# Patient Record
Sex: FEMALE | Race: WHITE | NOT HISPANIC OR LATINO | URBAN - METROPOLITAN AREA
[De-identification: names, ages, dates, MRNs, and addresses within clinical notes are randomized per-mention and may not be internally consistent; named-entity substitution may affect disease eponyms.]

---

## 2017-09-19 ENCOUNTER — APPOINTMENT (RX ONLY)
Dept: URBAN - METROPOLITAN AREA CLINIC 43 | Facility: CLINIC | Age: 82
Setting detail: DERMATOLOGY
End: 2017-09-19

## 2017-09-19 PROBLEM — D48.5 NEOPLASM OF UNCERTAIN BEHAVIOR OF SKIN: Status: ACTIVE | Noted: 2017-09-19

## 2017-09-19 PROBLEM — L20.84 INTRINSIC (ALLERGIC) ECZEMA: Status: ACTIVE | Noted: 2017-09-19

## 2017-09-19 PROCEDURE — ? TREATMENT REGIMEN

## 2017-09-19 PROCEDURE — 99212 OFFICE O/P EST SF 10 MIN: CPT | Mod: 25

## 2017-09-19 PROCEDURE — ? BIOPSY BY SHAVE METHOD AND DESTRUCTION

## 2017-09-19 PROCEDURE — 17263 DSTRJ MAL LES T/A/L 2.1-3.0: CPT

## 2017-09-19 NOTE — PROCEDURE: BIOPSY BY SHAVE METHOD AND DESTRUCTION
Number Of Curettages: 3
Consent: Written consent was obtained and risks were reviewed including but not limited to scarring, infection, bleeding, scabbing, incomplete removal, nerve damage and allergy to anesthesia.
Wound Care: Petrolatum
Anesthesia Type: 1% lidocaine with epinephrine
Billing Type: Third-Party Bill
Size Of Lesion After Curettage: 2.6
Accession #: [id number]
Bill As?: Malignant Destruction
Destruction Type: electrodesiccation
Lab: [id number]
Bill For Surgical Tray: no
Size Of Lesion In Cm (Optional): 2.3
Notification Instructions: Patient will be notified of biopsy results. However, patient instructed to call the office if not contacted within 2 weeks.
Detail Level: Detailed
Anesthesia Volume In Cc: 1
Post-Care Instructions: I reviewed with the patient in detail post-care instructions. Patient is to keep the biopsy site dry overnight, and then apply plain petrolatum (Vaseline ointment) twice daily until healed. Patient may apply hydrogen peroxide soaks to remove any crusting.
Biopsy Type: H and E

## 2017-09-19 NOTE — PROCEDURE: TREATMENT REGIMEN
Detail Level: Zone
Notification: Please note that there are new Treatment Regimen plans which have an enhanced patient education handout experience.  The plans are called Treatment Regimen (Acne), Treatment Regimen (Atopic Dermatitis), Treatment Regimen (Rosacea), Treatment Regimen (Sun Protection), Treatment Regimen (Cosmetic Products), and Treatment Regimen (Xerosis).
Plan: About 5 small lesions concerning for possible small squamous cell carcinoma \\nHas had SCC on the legs in the past\\nNone are painful or otherwise bothersome\\nWe discussed performing shave biopsy with C&D treatment today and she prefers to defer this given lack of symptoms and the other larger lesion on the left leg that needs treatment \\nWill treat in future as needed\\nShe felt comfortable with this plan

## 2018-03-07 ENCOUNTER — RX ONLY (OUTPATIENT)
Age: 83
Setting detail: RX ONLY
End: 2018-03-07

## 2018-03-07 ENCOUNTER — APPOINTMENT (RX ONLY)
Dept: URBAN - METROPOLITAN AREA CLINIC 43 | Facility: CLINIC | Age: 83
Setting detail: DERMATOLOGY
End: 2018-03-07

## 2018-03-07 DIAGNOSIS — R60.0 LOCALIZED EDEMA: ICD-10-CM

## 2018-03-07 DIAGNOSIS — L97 NON-PRESSURE CHRONIC ULCER OF LOWER LIMB, NOT ELSEWHERE CLASSIFIED: ICD-10-CM

## 2018-03-07 PROBLEM — L97.819 NON-PRESSURE CHRONIC ULCER OF OTHER PART OF RIGHT LOWER LEG WITH UNSPECIFIED SEVERITY: Status: ACTIVE | Noted: 2018-03-07

## 2018-03-07 PROCEDURE — ? TREATMENT REGIMEN

## 2018-03-07 PROCEDURE — 99213 OFFICE O/P EST LOW 20 MIN: CPT

## 2018-03-07 RX ORDER — MUPIROCIN 20 MG/G
OINTMENT TOPICAL
Qty: 1 | Refills: 1 | Status: ERX | COMMUNITY
Start: 2018-03-07

## 2018-03-07 ASSESSMENT — LOCATION DETAILED DESCRIPTION DERM
LOCATION DETAILED: RIGHT PROXIMAL PRETIBIAL REGION
LOCATION DETAILED: LEFT DISTAL PRETIBIAL REGION
LOCATION DETAILED: RIGHT DISTAL PRETIBIAL REGION

## 2018-03-07 ASSESSMENT — LOCATION SIMPLE DESCRIPTION DERM
LOCATION SIMPLE: LEFT PRETIBIAL REGION
LOCATION SIMPLE: RIGHT PRETIBIAL REGION

## 2018-03-07 ASSESSMENT — LOCATION ZONE DERM: LOCATION ZONE: LEG

## 2018-03-07 NOTE — PROCEDURE: TREATMENT REGIMEN
Plan: Lower extremity ulcer, large\\n- Daily wound care:\\n* gently wash with warm soapy water once daily and gently rub to remove goo or crust\\n* The soap you use doesn’t matter: can try Cetaphil gentle skin cleanser or plain bar soap or liquid hand soap if desired \\n* pat dry with soft cloth and apply a thin layer of mupirocin antibiotic ointment over the whole area\\n* put a non-stick pad over the wound\\n* you can secure the pad with a Coban wrap or paper adhesive tape\\n* wear your compression stockings all day and remove the stockings at night\\n- Recheck in 1 week \\n- The wound may ultimately be caused by a skin cancer in the area and I will be following it closely and possibly doing another biopsy to confirm
Detail Level: Zone
Plan: Increased leg swelling despite 24 hour per day compression\\nI recommend purchasing new knee-high compression stockings, 20-30mm Hg, as the elastic does wear out after 6-12 mo with daily use \\nI recommend checking in with your primary care physician to be sure you are receiving adequate nutrition \\nYou will need someone to help with your leg once daily

## 2018-03-14 ENCOUNTER — APPOINTMENT (RX ONLY)
Dept: URBAN - METROPOLITAN AREA CLINIC 43 | Facility: CLINIC | Age: 83
Setting detail: DERMATOLOGY
End: 2018-03-14

## 2018-03-14 PROBLEM — D48.5 NEOPLASM OF UNCERTAIN BEHAVIOR OF SKIN: Status: ACTIVE | Noted: 2018-03-14

## 2018-03-14 PROCEDURE — 11100: CPT

## 2018-03-14 PROCEDURE — 11101: CPT

## 2018-03-14 PROCEDURE — ? BIOPSY BY SHAVE METHOD

## 2018-03-14 NOTE — PROCEDURE: BIOPSY BY SHAVE METHOD
Anesthesia Volume In Cc: 0
Detail Level: Detailed
Post-Care Instructions: I reviewed with the patient in detail post-care instructions. Patient is to keep the biopsy site dry and the bandage in place overnight.  Then wash once daily with warm soapy water, pat dry gently and apply mupirocin ointment once daily over the wound.  Cover with a nonstick dressing and mupirocin.
Billing Type: Third-Party Bill
Biopsy Type: H and E
Dressing: dry sterile dressing
Notification Instructions: Patient will be notified of biopsy results by telephone within 7-10 days in most cases. However, patient instructed to call the office if not contacted within 2 weeks.
Electrodesiccation And Curettage Text: The wound bed was treated with electrodesiccation and curettage after the biopsy was performed.
Destruction After The Procedure: No
Consent: Written consent was obtained and risks were reviewed including but not limited to scarring, infection, bleeding, scabbing, incomplete removal, nerve damage and allergy to anesthesia.
Curettage Text: Curettage and cautery x 1 passes performed at time of biopsy
Lab: [id number]
Wound Care: Petrolatum
Anesthesia Type: 2% Xylocaine with sodium bicarbonate
Electrodesiccation Text: The wound bed was treated with electrodesiccation after the biopsy was performed.
Accession #: [id number]
Biopsy Method: Acu-Razor
Hemostasis: Pressure
Render Post-Care Instructions In Note?: yes
Silver Nitrate Text: The wound bed was treated with silver nitrate after the biopsy was performed.
Type Of Destruction Used: Curettage
Lab Facility: [id number]
Cryotherapy Text: The wound bed was treated with cryotherapy after the biopsy was performed.
Accession #: [id number]
Accession #: [id number]

## 2018-04-09 ENCOUNTER — APPOINTMENT (RX ONLY)
Dept: URBAN - METROPOLITAN AREA CLINIC 43 | Facility: CLINIC | Age: 83
Setting detail: DERMATOLOGY
End: 2018-04-09

## 2018-04-09 PROBLEM — C44.722 SQUAMOUS CELL CARCINOMA OF SKIN OF RIGHT LOWER LIMB, INCLUDING HIP: Status: ACTIVE | Noted: 2018-04-09

## 2018-04-09 PROCEDURE — ? TREATMENT REGIMEN

## 2018-04-09 PROCEDURE — ? INTRALESIONAL CHEMOTHERAPY INJECTION

## 2018-04-09 PROCEDURE — 96405 CHEMO INTRALESIONAL UP TO 7: CPT

## 2018-04-09 NOTE — PROCEDURE: TREATMENT REGIMEN
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 \\nCurrently asymptomatic and healed over \\nDiscussed treatment options including Mohs surgical excision with skin graft which she does not want\\nDiscussed radiation therapy and she likewise declined \\nDiscussed injections of methotrexate into the tumor which could shrink the tumor to the point that it is less likely to continue to enlarge and less likely to be painful or form an open sore (ulcer) again \\nWe also discussed observation only where she can contact me and her friend can check on it weekly and she can return for treatment if it should ever become bothersome for her although this may be difficult for me to treat at that point \\nDiscussed that I anticipate that this skin cancer will continue to enlarge and likely open back up into a painful open sore again \\nUltimately, she opted for a trial of the intralesional methotrexate shot\\nShe will return in 2 weeks for a recheck of the area
Detail Level: Zone

## 2018-04-09 NOTE — PROCEDURE: INTRALESIONAL CHEMOTHERAPY INJECTION
Detail Level: Detailed
Bill J-Code?: Yes
X Size Of Lesion In Cm (Optional): 2.1
Medication Injected: Methotrexate
Size Of Lesion In Cm (Optional): 2.2
Treatment Number (Optional): 1
Concentration (Mg/Ml Or Units/Ml): 25.0
Lot # (Optional): [id number]
Render Post-Care Instructions In Note?: no
Post-Care Instructions: I reviewed with the patient in detail post-care instructions.  Apply plain vaseline ointment to any raw or open areas.
Consent: The risks of medication reaction, injection site pain and lesion necrosis were reviewed with the patient.
Bill As A Line Item Or As Units: Line Item

## 2018-05-01 ENCOUNTER — APPOINTMENT (RX ONLY)
Dept: URBAN - METROPOLITAN AREA CLINIC 43 | Facility: CLINIC | Age: 83
Setting detail: DERMATOLOGY
End: 2018-05-01

## 2018-05-01 DIAGNOSIS — T1490XA CONTUSION OF UNSPECIFIED SITE: ICD-10-CM

## 2018-05-01 PROBLEM — S80.12XA CONTUSION OF LEFT LOWER LEG, INITIAL ENCOUNTER: Status: ACTIVE | Noted: 2018-05-01

## 2018-05-01 PROBLEM — C44.722 SQUAMOUS CELL CARCINOMA OF SKIN OF RIGHT LOWER LIMB, INCLUDING HIP: Status: ACTIVE | Noted: 2018-05-01

## 2018-05-01 PROCEDURE — ? COUNSELING

## 2018-05-01 PROCEDURE — 96405 CHEMO INTRALESIONAL UP TO 7: CPT

## 2018-05-01 PROCEDURE — ? INTRALESIONAL CHEMOTHERAPY INJECTION

## 2018-05-01 PROCEDURE — 10140 I&D HMTMA SEROMA/FLUID COLLJ: CPT

## 2018-05-01 PROCEDURE — ? TREATMENT REGIMEN

## 2018-05-01 PROCEDURE — ? INCISION AND DRAINAGE

## 2018-05-01 ASSESSMENT — LOCATION ZONE DERM: LOCATION ZONE: LEG

## 2018-05-01 ASSESSMENT — LOCATION SIMPLE DESCRIPTION DERM: LOCATION SIMPLE: LEFT PRETIBIAL REGION

## 2018-05-01 ASSESSMENT — LOCATION DETAILED DESCRIPTION DERM: LOCATION DETAILED: LEFT PROXIMAL PRETIBIAL REGION

## 2018-05-01 NOTE — PROCEDURE: TREATMENT REGIMEN
Detail Level: Zone
Plan: SCC, recurrent after C&D\\n- patient not candidate for Mohs given her preference, severe cognitive impairment and social situation \\n- less pain and now healed over after first IL MTX administration\\n- will repeat IL MTX 25mg to tumor again today\\n- recheck in 2 weeks
Plan: DRained coagulated blood today\\nDressed with telfa and compression stocking\\nNo apparent continued bleeding after evacuation\\nUnknown trauma provoked it\\nRecheck in 2 weeks

## 2018-05-01 NOTE — PROCEDURE: INTRALESIONAL CHEMOTHERAPY INJECTION
Consent: The risks of medication reaction, injection site pain and lesion necrosis were reviewed with the patient.
Medication Injected: Methotrexate
Concentration (Mg/Ml Or Units/Ml): 25.0
Total Volume (Ccs): 1
Bill J-Code?: Yes
Detail Level: Detailed
Post-Care Instructions: I reviewed with the patient in detail post-care instructions.  Apply plain vaseline ointment to any raw or open areas.
Treatment Number (Optional): 2
Size Of Lesion In Cm (Optional): 2.2
Administered By (Optional): Dr. Robles
X Size Of Lesion In Cm (Optional): 2.1
Lot # (Optional): [id number]
Render Post-Care Instructions In Note?: no
Bill As A Line Item Or As Units: Line Item

## 2018-05-01 NOTE — PROCEDURE: INCISION AND DRAINAGE
Suture Text: The incision was partially closed with
Detail Level: Detailed
Epidermal Closure: simple interrupted
Curette Text (Optional): After the contents were expressed a curette was used to partially remove the cyst wall
Dressing: pressure dressing with telfa
Drainage Type?: bloody
Render Postcare In Note?: No
Preparation Text: The area was prepped in the usual clean fashion.
Post-Care Instructions: I reviewed with the patient in detail post-care instructions. Patient should keep wound covered and call the office should any redness, pain, swelling or worsening occur.  Hot soaks or compresses 4-6 times daily until healed.
Lesion Type: Hematoma
Consent was obtained and risks were reviewed including but not limited to delayed wound healing, infection, need for multiple I and D's, and pain.
Size Of Lesion In Cm (Optional But May Be Required For Some Insurances): 4
Drainage Amount?: significant
Method: 15 blade
Epidermal Sutures: 4-0 Ethilon
Anesthesia Volume In Cc: 1

## 2018-05-23 ENCOUNTER — APPOINTMENT (RX ONLY)
Dept: URBAN - METROPOLITAN AREA CLINIC 43 | Facility: CLINIC | Age: 83
Setting detail: DERMATOLOGY
End: 2018-05-23

## 2018-05-23 DIAGNOSIS — L92.8 OTHER GRANULOMATOUS DISORDERS OF THE SKIN AND SUBCUTANEOUS TISSUE: ICD-10-CM

## 2018-05-23 PROBLEM — C44.722 SQUAMOUS CELL CARCINOMA OF SKIN OF RIGHT LOWER LIMB, INCLUDING HIP: Status: ACTIVE | Noted: 2018-05-23

## 2018-05-23 PROBLEM — L98.8 OTHER SPECIFIED DISORDERS OF THE SKIN AND SUBCUTANEOUS TISSUE: Status: ACTIVE | Noted: 2018-05-23

## 2018-05-23 PROCEDURE — 96405 CHEMO INTRALESIONAL UP TO 7: CPT

## 2018-05-23 PROCEDURE — ? INTRALESIONAL CHEMOTHERAPY INJECTION

## 2018-05-23 PROCEDURE — ? TREATMENT REGIMEN

## 2018-05-23 PROCEDURE — 99212 OFFICE O/P EST SF 10 MIN: CPT | Mod: 25

## 2018-05-23 ASSESSMENT — LOCATION DETAILED DESCRIPTION DERM: LOCATION DETAILED: LEFT PROXIMAL PRETIBIAL REGION

## 2018-05-23 ASSESSMENT — LOCATION ZONE DERM: LOCATION ZONE: LEG

## 2018-05-23 ASSESSMENT — LOCATION SIMPLE DESCRIPTION DERM: LOCATION SIMPLE: LEFT PRETIBIAL REGION

## 2018-05-23 NOTE — PROCEDURE: TREATMENT REGIMEN
Plan: SCC, recurrent after C&D\\n- patient not candidate for Mohs given her preference, severe cognitive impairment and social situation \\n- continues to not be painful and not enlarging \\n- will repeat IL MTX 25mg to tumor again today; consider use of 5FU \\n- recheck in 2 weeks
Detail Level: Zone
Plan: At base of wound from recent large hematoma\\nHealing in well\\nRecheck at next visit

## 2018-05-23 NOTE — PROCEDURE: INTRALESIONAL CHEMOTHERAPY INJECTION
Total Volume (Ccs): 1
Consent: The risks of medication reaction, injection site pain and lesion necrosis were reviewed with the patient.
Detail Level: Detailed
Lot # (Optional): [id number]
Administered By (Optional): Dr. Robles
Bill As A Line Item Or As Units: Line Item
Medication Injected: Methotrexate
Treatment Number (Optional): 3
Bill J-Code?: Yes
Concentration (Mg/Ml Or Units/Ml): 25.0
X Size Of Lesion In Cm (Optional): 2.1
Size Of Lesion In Cm (Optional): 2.2
Post-Care Instructions: I reviewed with the patient in detail post-care instructions.  Apply plain vaseline ointment to any raw or open areas.
Render Post-Care Instructions In Note?: no

## 2018-06-12 ENCOUNTER — APPOINTMENT (RX ONLY)
Dept: URBAN - METROPOLITAN AREA CLINIC 43 | Facility: CLINIC | Age: 83
Setting detail: DERMATOLOGY
End: 2018-06-12

## 2018-06-12 DIAGNOSIS — L92.8 OTHER GRANULOMATOUS DISORDERS OF THE SKIN AND SUBCUTANEOUS TISSUE: ICD-10-CM

## 2018-06-12 PROBLEM — C44.722 SQUAMOUS CELL CARCINOMA OF SKIN OF RIGHT LOWER LIMB, INCLUDING HIP: Status: ACTIVE | Noted: 2018-06-12

## 2018-06-12 PROBLEM — L98.8 OTHER SPECIFIED DISORDERS OF THE SKIN AND SUBCUTANEOUS TISSUE: Status: ACTIVE | Noted: 2018-06-12

## 2018-06-12 PROCEDURE — ? INTRALESIONAL CHEMOTHERAPY INJECTION

## 2018-06-12 PROCEDURE — ? TREATMENT REGIMEN

## 2018-06-12 PROCEDURE — 96405 CHEMO INTRALESIONAL UP TO 7: CPT

## 2018-06-12 PROCEDURE — 99212 OFFICE O/P EST SF 10 MIN: CPT | Mod: 25

## 2018-06-12 ASSESSMENT — LOCATION SIMPLE DESCRIPTION DERM
LOCATION SIMPLE: LEFT PRETIBIAL REGION
LOCATION SIMPLE: LEFT PRETIBIAL REGION

## 2018-06-12 ASSESSMENT — LOCATION ZONE DERM
LOCATION ZONE: LEG
LOCATION ZONE: LEG

## 2018-06-12 ASSESSMENT — LOCATION DETAILED DESCRIPTION DERM
LOCATION DETAILED: LEFT PROXIMAL PRETIBIAL REGION
LOCATION DETAILED: LEFT PROXIMAL PRETIBIAL REGION

## 2018-06-12 NOTE — PROCEDURE: INTRALESIONAL CHEMOTHERAPY INJECTION
Bill J-Code?: Yes
X Size Of Lesion In Cm (Optional): 2.4
Bill As A Line Item Or As Units: Line Item
Concentration (Mg/Ml Or Units/Ml): 50.0
Medication Injected: 5-Fluorouracil
Size Of Lesion In Cm (Optional): 2.2
Treatment Number (Optional): 3
Consent: The risks of medication reaction, injection site pain and lesion necrosis were reviewed with the patient.
Post-Care Instructions: I reviewed with the patient in detail post-care instructions.  Apply plain vaseline ointment to any raw or open areas.
Render Post-Care Instructions In Note?: no
Administered By (Optional): Dr. Robles
Total Volume (Ccs): 1
Detail Level: Detailed

## 2018-06-12 NOTE — PROCEDURE: TREATMENT REGIMEN
Plan: SCC, recurrent after C&D\\n- patient not candidate for Mohs given her preference, severe cognitive impairment and social situation \\n- continues to not be painful and not enlarging \\n- will repeat IL MTX 25mg to tumor again today; consider use of 5FU \\n- recheck in 2 weeks
Detail Level: Zone
Plan: Wash daily with warm soapy water, pat dry\\nApply thin layer of triamcinolone ointment to red areas\\nCover with nonstick dressing

## 2018-08-14 ENCOUNTER — APPOINTMENT (RX ONLY)
Dept: URBAN - METROPOLITAN AREA CLINIC 43 | Facility: CLINIC | Age: 83
Setting detail: DERMATOLOGY
End: 2018-08-14

## 2018-08-14 DIAGNOSIS — T1490XA CONTUSION OF UNSPECIFIED SITE: ICD-10-CM | Status: RESOLVED

## 2018-08-14 DIAGNOSIS — L97 NON-PRESSURE CHRONIC ULCER OF LOWER LIMB, NOT ELSEWHERE CLASSIFIED: ICD-10-CM

## 2018-08-14 PROBLEM — S80.12XA CONTUSION OF LEFT LOWER LEG, INITIAL ENCOUNTER: Status: ACTIVE | Noted: 2018-08-14

## 2018-08-14 PROBLEM — L97.829 NON-PRESSURE CHRONIC ULCER OF OTHER PART OF LEFT LOWER LEG WITH UNSPECIFIED SEVERITY: Status: ACTIVE | Noted: 2018-08-14

## 2018-08-14 PROBLEM — C44.722 SQUAMOUS CELL CARCINOMA OF SKIN OF RIGHT LOWER LIMB, INCLUDING HIP: Status: ACTIVE | Noted: 2018-08-14

## 2018-08-14 PROCEDURE — ? PRESCRIPTION

## 2018-08-14 PROCEDURE — ? TREATMENT REGIMEN

## 2018-08-14 PROCEDURE — ? ORDER TESTS

## 2018-08-14 PROCEDURE — 96405 CHEMO INTRALESIONAL UP TO 7: CPT

## 2018-08-14 PROCEDURE — 99213 OFFICE O/P EST LOW 20 MIN: CPT | Mod: 25

## 2018-08-14 PROCEDURE — ? INTRALESIONAL CHEMOTHERAPY INJECTION

## 2018-08-14 RX ORDER — SILVER SULFADIAZINE 10 MG/G
CREAM TOPICAL
Qty: 1 | Refills: 4 | Status: ERX | COMMUNITY
Start: 2018-08-14

## 2018-08-14 RX ADMIN — SILVER SULFADIAZINE: 10 CREAM TOPICAL at 19:35

## 2018-08-14 ASSESSMENT — LOCATION ZONE DERM: LOCATION ZONE: LEG

## 2018-08-14 ASSESSMENT — LOCATION DETAILED DESCRIPTION DERM
LOCATION DETAILED: LEFT PROXIMAL PRETIBIAL REGION
LOCATION DETAILED: LEFT DISTAL PRETIBIAL REGION

## 2018-08-14 ASSESSMENT — LOCATION SIMPLE DESCRIPTION DERM: LOCATION SIMPLE: LEFT PRETIBIAL REGION

## 2018-08-14 NOTE — PROCEDURE: INTRALESIONAL CHEMOTHERAPY INJECTION
Bill J-Code?: Yes
Consent: The risks of medication reaction, injection site pain and lesion necrosis were reviewed with the patient.
Size Of Lesion In Cm (Optional): 0.8
Post-Care Instructions: I reviewed with the patient in detail post-care instructions.  Apply plain vaseline ointment to any raw or open areas.
Medication Injected: 5-Fluorouracil
Concentration (Mg/Ml Or Units/Ml): 50.0
Render Post-Care Instructions In Note?: no
Administered By (Optional): Dr. Robles
Bill As A Line Item Or As Units: Line Item
Total Volume (Ccs): 0.2
Detail Level: Detailed
Treatment Number (Optional): 4

## 2018-08-14 NOTE — PROCEDURE: TREATMENT REGIMEN
Plan: Returns today after 3 sessions of intralesional 5FU to recurrent SCC on right lower leg\\nMarkedly improved today with resolution of bleeding and pain\\nClinically much smaller, now appears to be 3 small indurated papules in region of prior larger SCC tumor\\nRe-injected with 5FU to each small papule today \\n\\nWill plan to see back for formal re-evaluation in 1 mo
Detail Level: Zone
Plan: Large non-healing ulcer, present approx 6 wks according to patient’s friend Mery \\nSuspect heavy colonization or early infection - swabbing to r/o pseudomonas \\nDaily soap and water cleansing followed by Silvadene cream and compression wrap\\nPatient agreeable with home health RN coming to assist with dressing changes since she does not remember to do this herself \\nRecheck in 2 weeks
Plan: Had a large hematoma that subsequently sloughed and left large plaque of granulation tissue\\nArea has now healed completely

## 2018-08-21 ENCOUNTER — RX ONLY (OUTPATIENT)
Age: 83
Setting detail: RX ONLY
End: 2018-08-21

## 2018-08-21 RX ORDER — SULFAMETHOXAZOLE AND TRIMETHOPRIM 400; 80 MG/1; MG/1
TABLET ORAL
Qty: 14 | Refills: 0 | Status: ERX | COMMUNITY
Start: 2018-08-21